# Patient Record
Sex: MALE | Race: WHITE | NOT HISPANIC OR LATINO | URBAN - METROPOLITAN AREA
[De-identification: names, ages, dates, MRNs, and addresses within clinical notes are randomized per-mention and may not be internally consistent; named-entity substitution may affect disease eponyms.]

---

## 2017-05-17 ENCOUNTER — EMERGENCY (EMERGENCY)
Facility: HOSPITAL | Age: 27
LOS: 1 days | Discharge: HOME | End: 2017-05-17
Attending: INTERNAL MEDICINE
Payer: COMMERCIAL

## 2017-05-17 VITALS
SYSTOLIC BLOOD PRESSURE: 145 MMHG | TEMPERATURE: 99 F | HEIGHT: 73 IN | RESPIRATION RATE: 20 BRPM | HEART RATE: 115 BPM | OXYGEN SATURATION: 98 % | WEIGHT: 259.93 LBS | DIASTOLIC BLOOD PRESSURE: 91 MMHG

## 2017-05-17 PROCEDURE — 99283 EMERGENCY DEPT VISIT LOW MDM: CPT

## 2017-05-17 RX ORDER — LACOSAMIDE 50 MG/1
200 TABLET ORAL ONCE
Qty: 0 | Refills: 0 | Status: DISCONTINUED | OUTPATIENT
Start: 2017-05-17 | End: 2017-05-17

## 2017-05-17 RX ORDER — DIVALPROEX SODIUM 500 MG/1
250 TABLET, DELAYED RELEASE ORAL ONCE
Qty: 0 | Refills: 0 | Status: COMPLETED | OUTPATIENT
Start: 2017-05-17 | End: 2017-05-17

## 2017-05-17 RX ORDER — DIVALPROEX SODIUM 500 MG/1
500 TABLET, DELAYED RELEASE ORAL ONCE
Qty: 0 | Refills: 0 | Status: COMPLETED | OUTPATIENT
Start: 2017-05-17 | End: 2017-05-17

## 2017-05-17 RX ADMIN — LACOSAMIDE 200 MILLIGRAM(S): 50 TABLET ORAL at 23:08

## 2017-05-17 RX ADMIN — DIVALPROEX SODIUM 250 MILLIGRAM(S): 500 TABLET, DELAYED RELEASE ORAL at 23:08

## 2017-05-17 RX ADMIN — DIVALPROEX SODIUM 500 MILLIGRAM(S): 500 TABLET, DELAYED RELEASE ORAL at 23:08

## 2017-05-17 NOTE — ED PROVIDER NOTE - OBJECTIVE STATEMENT
27 y/o M with PMHx of epilepsy BIB EMS presents to ED c/o seizure today. Pt states that seizures have been progressively more frequent for the past 6-9 months. He has been seen by his neurologist with multiple episodes of follow up and extensive work up but has had no medication changes. This afternoon, pt had a seizure witnessed by girlfriend. Pt described to turn blue, so pt's girlfriend called EMS. Seizure with spontaneous resolution lasting < 2 min total, which is normal for him. Generalized tonic clonic seizure, which is also normal for pt. Pt denies any recent illness, fever, headache, visual changes, AMS, chest pain. cough, nausea, vomiting, abd pain, urinary symptoms or any other complaints. NKDA. Pt states that he has been missing one medication for several days because he did not get the rx mailed to him in time. Pt also notes that he has had abnormal sleeping patterns - he went to bed after 4am last night, likely trigger for his seizure.

## 2017-05-17 NOTE — ED PROVIDER NOTE - MEDICAL DECISION MAKING DETAILS
Pt presenting with typical seizure, he is off one of his prescribed medication. Will provide doses of meds here, pt confirms having received rx for medication this afternoon and will fill prescription and resume regular meds first thing in morning. Attempted to discuss with neurologist, but neurologist is unavailable at this hour. Pt is stable for discharge home with neurology follow up this week.

## 2017-05-17 NOTE — ED PROVIDER NOTE - NS ED MD SCRIBE ATTENDING SCRIBE SECTIONS
HISTORY OF PRESENT ILLNESS/REVIEW OF SYSTEMS/VITAL SIGNS( Pullset)/HIV/PHYSICAL EXAM/PAST MEDICAL/SURGICAL/SOCIAL HISTORY/DISPOSITION

## 2017-05-17 NOTE — ED ADULT NURSE NOTE - OBJECTIVE STATEMENT
[facility] s/p seizure witnessed by family member that lasted over a minute with no trauma to the head . Patient stated has skipped a few doses and did not get to eat anything earlier today. Denies any other medical complaints.

## 2017-05-17 NOTE — ED ADULT TRIAGE NOTE - CHIEF COMPLAINT QUOTE
BIBA for witnessed gen tonic clonic seizure lasting 1 minute, h/o of seizure, pt has missed some meds the past few days and was also told his ammonia levels were high by his pmd

## 2017-05-17 NOTE — ED PROVIDER NOTE - NEUROLOGICAL, MLM
Alert and oriented, no focal deficits, no motor or sensory deficits. Alert and oriented, no focal deficits, no motor or sensory deficits. Coordination/gait normal.

## 2017-05-21 DIAGNOSIS — G40.909 EPILEPSY, UNSPECIFIED, NOT INTRACTABLE, WITHOUT STATUS EPILEPTICUS: ICD-10-CM

## 2018-04-21 ENCOUNTER — EMERGENCY (EMERGENCY)
Facility: HOSPITAL | Age: 28
LOS: 1 days | Discharge: HOME | End: 2018-04-21
Attending: EMERGENCY MEDICINE
Payer: COMMERCIAL

## 2018-04-21 VITALS
WEIGHT: 220.02 LBS | OXYGEN SATURATION: 98 % | TEMPERATURE: 97 F | HEART RATE: 124 BPM | RESPIRATION RATE: 17 BRPM | DIASTOLIC BLOOD PRESSURE: 81 MMHG | SYSTOLIC BLOOD PRESSURE: 116 MMHG

## 2018-04-21 LAB
ACETONE SERPL-MCNC: ABNORMAL
ALBUMIN SERPL ELPH-MCNC: 4.3 G/DL — SIGNIFICANT CHANGE UP (ref 3.5–5)
ALP SERPL-CCNC: 78 U/L — SIGNIFICANT CHANGE UP (ref 40–120)
ALT FLD-CCNC: 27 U/L DA — SIGNIFICANT CHANGE UP (ref 10–60)
AMPHET UR-MCNC: NEGATIVE — SIGNIFICANT CHANGE UP
ANION GAP SERPL CALC-SCNC: 9 MMOL/L — SIGNIFICANT CHANGE UP (ref 5–17)
APPEARANCE UR: CLEAR — SIGNIFICANT CHANGE UP
AST SERPL-CCNC: 20 U/L — SIGNIFICANT CHANGE UP (ref 10–40)
BACTERIA # UR AUTO: ABNORMAL /HPF
BARBITURATES UR SCN-MCNC: NEGATIVE — SIGNIFICANT CHANGE UP
BASOPHILS # BLD AUTO: 0.1 K/UL — SIGNIFICANT CHANGE UP (ref 0–0.2)
BASOPHILS NFR BLD AUTO: 1 % — SIGNIFICANT CHANGE UP (ref 0–2)
BENZODIAZ UR-MCNC: NEGATIVE — SIGNIFICANT CHANGE UP
BILIRUB SERPL-MCNC: 1 MG/DL — SIGNIFICANT CHANGE UP (ref 0.2–1.2)
BILIRUB UR-MCNC: NEGATIVE — SIGNIFICANT CHANGE UP
BUN SERPL-MCNC: 17 MG/DL — SIGNIFICANT CHANGE UP (ref 7–18)
CALCIUM SERPL-MCNC: 9.3 MG/DL — SIGNIFICANT CHANGE UP (ref 8.4–10.5)
CHLORIDE SERPL-SCNC: 106 MMOL/L — SIGNIFICANT CHANGE UP (ref 96–108)
CK SERPL-CCNC: 124 U/L — SIGNIFICANT CHANGE UP (ref 35–232)
CO2 SERPL-SCNC: 25 MMOL/L — SIGNIFICANT CHANGE UP (ref 22–31)
COCAINE METAB.OTHER UR-MCNC: NEGATIVE — SIGNIFICANT CHANGE UP
COLOR SPEC: YELLOW — SIGNIFICANT CHANGE UP
CREAT SERPL-MCNC: 1.02 MG/DL — SIGNIFICANT CHANGE UP (ref 0.5–1.3)
DIFF PNL FLD: ABNORMAL
EOSINOPHIL # BLD AUTO: 0 K/UL — SIGNIFICANT CHANGE UP (ref 0–0.5)
EOSINOPHIL NFR BLD AUTO: 0.5 % — SIGNIFICANT CHANGE UP (ref 0–6)
EPI CELLS # UR: SIGNIFICANT CHANGE UP /HPF
GLUCOSE SERPL-MCNC: 84 MG/DL — SIGNIFICANT CHANGE UP (ref 70–99)
GLUCOSE UR QL: NEGATIVE — SIGNIFICANT CHANGE UP
HCT VFR BLD CALC: 45.2 % — SIGNIFICANT CHANGE UP (ref 39–50)
HGB BLD-MCNC: 15.2 G/DL — SIGNIFICANT CHANGE UP (ref 13–17)
HYALINE CASTS # UR AUTO: ABNORMAL /LPF
KETONES UR-MCNC: ABNORMAL
LACTATE SERPL-SCNC: 2.8 MMOL/L — HIGH (ref 0.7–2)
LEUKOCYTE ESTERASE UR-ACNC: ABNORMAL
LYMPHOCYTES # BLD AUTO: 1 K/UL — SIGNIFICANT CHANGE UP (ref 1–3.3)
LYMPHOCYTES # BLD AUTO: 17.2 % — SIGNIFICANT CHANGE UP (ref 13–44)
MAGNESIUM SERPL-MCNC: 1.8 MG/DL — SIGNIFICANT CHANGE UP (ref 1.6–2.6)
MCHC RBC-ENTMCNC: 30.8 PG — SIGNIFICANT CHANGE UP (ref 27–34)
MCHC RBC-ENTMCNC: 33.7 GM/DL — SIGNIFICANT CHANGE UP (ref 32–36)
MCV RBC AUTO: 91.1 FL — SIGNIFICANT CHANGE UP (ref 80–100)
METHADONE UR-MCNC: NEGATIVE — SIGNIFICANT CHANGE UP
MONOCYTES # BLD AUTO: 0.3 K/UL — SIGNIFICANT CHANGE UP (ref 0–0.9)
MONOCYTES NFR BLD AUTO: 5.9 % — SIGNIFICANT CHANGE UP (ref 2–14)
NEUTROPHILS # BLD AUTO: 4.2 K/UL — SIGNIFICANT CHANGE UP (ref 1.8–7.4)
NEUTROPHILS NFR BLD AUTO: 75.4 % — SIGNIFICANT CHANGE UP (ref 43–77)
NITRITE UR-MCNC: NEGATIVE — SIGNIFICANT CHANGE UP
OPIATES UR-MCNC: NEGATIVE — SIGNIFICANT CHANGE UP
PCP SPEC-MCNC: SIGNIFICANT CHANGE UP
PCP UR-MCNC: NEGATIVE — SIGNIFICANT CHANGE UP
PH UR: 5 — SIGNIFICANT CHANGE UP (ref 5–8)
PLATELET # BLD AUTO: 138 K/UL — LOW (ref 150–400)
POTASSIUM SERPL-MCNC: 3.8 MMOL/L — SIGNIFICANT CHANGE UP (ref 3.5–5.3)
POTASSIUM SERPL-SCNC: 3.8 MMOL/L — SIGNIFICANT CHANGE UP (ref 3.5–5.3)
PROT SERPL-MCNC: 7.5 G/DL — SIGNIFICANT CHANGE UP (ref 6–8.3)
PROT UR-MCNC: 30 MG/DL
RBC # BLD: 4.96 M/UL — SIGNIFICANT CHANGE UP (ref 4.2–5.8)
RBC # FLD: 12.7 % — SIGNIFICANT CHANGE UP (ref 10.3–14.5)
RBC CASTS # UR COMP ASSIST: ABNORMAL /HPF (ref 0–2)
SODIUM SERPL-SCNC: 140 MMOL/L — SIGNIFICANT CHANGE UP (ref 135–145)
SP GR SPEC: 1.02 — SIGNIFICANT CHANGE UP (ref 1.01–1.02)
THC UR QL: NEGATIVE — SIGNIFICANT CHANGE UP
UROBILINOGEN FLD QL: NEGATIVE — SIGNIFICANT CHANGE UP
VALPROATE SERPL-MCNC: <3 UG/ML — LOW (ref 50–100)
WBC # BLD: 5.6 K/UL — SIGNIFICANT CHANGE UP (ref 3.8–10.5)
WBC # FLD AUTO: 5.6 K/UL — SIGNIFICANT CHANGE UP (ref 3.8–10.5)
WBC UR QL: ABNORMAL /HPF (ref 0–5)

## 2018-04-21 PROCEDURE — 80164 ASSAY DIPROPYLACETIC ACD TOT: CPT

## 2018-04-21 PROCEDURE — 81001 URINALYSIS AUTO W/SCOPE: CPT

## 2018-04-21 PROCEDURE — 80053 COMPREHEN METABOLIC PANEL: CPT

## 2018-04-21 PROCEDURE — 83735 ASSAY OF MAGNESIUM: CPT

## 2018-04-21 PROCEDURE — 82550 ASSAY OF CK (CPK): CPT

## 2018-04-21 PROCEDURE — 83605 ASSAY OF LACTIC ACID: CPT

## 2018-04-21 PROCEDURE — 82009 KETONE BODYS QUAL: CPT

## 2018-04-21 PROCEDURE — 80307 DRUG TEST PRSMV CHEM ANLYZR: CPT

## 2018-04-21 PROCEDURE — 99283 EMERGENCY DEPT VISIT LOW MDM: CPT

## 2018-04-21 PROCEDURE — 99285 EMERGENCY DEPT VISIT HI MDM: CPT

## 2018-04-21 PROCEDURE — 85027 COMPLETE CBC AUTOMATED: CPT

## 2018-04-21 PROCEDURE — 82962 GLUCOSE BLOOD TEST: CPT

## 2018-04-21 RX ORDER — PERAMPANEL 2 MG/1
1 TABLET ORAL
Qty: 14 | Refills: 0 | OUTPATIENT
Start: 2018-04-21 | End: 2018-05-04

## 2018-04-21 RX ORDER — SODIUM CHLORIDE 9 MG/ML
1000 INJECTION INTRAMUSCULAR; INTRAVENOUS; SUBCUTANEOUS
Qty: 0 | Refills: 0 | Status: DISCONTINUED | OUTPATIENT
Start: 2018-04-21 | End: 2018-04-25

## 2018-04-21 RX ORDER — LACOSAMIDE 50 MG/1
1 TABLET ORAL
Qty: 28 | Refills: 0 | OUTPATIENT
Start: 2018-04-21 | End: 2018-05-04

## 2018-04-21 NOTE — ED ADULT NURSE NOTE - OBJECTIVE STATEMENT
Patient brought in for witnessed seizure while out in public. Patient is now A&Ox3, speaking clearly, in no acute distress. Patient said he had a seizure while jogging outside, can't recall how long it lasted. Patient said that he didn't take his seizure meds today.

## 2018-04-21 NOTE — ED ADULT NURSE NOTE - CHPI ED SYMPTOMS NEG
no change in level of consciousness/no confusion/no fever/no blurred vision/no vomiting/no nausea/no numbness/no loss of consciousness

## 2018-04-21 NOTE — ED PROVIDER NOTE - PROGRESS NOTE DETAILS
Pt refuses IV anti-seizure medication, claims he's on Vimpat and Fycompa. Pt refuses IV fluids in ED as well.  Mother claims they will go to the pharmacy and get the medication ASAP. Pt w/ no further sz activity in ED. Will discharge home and advise pt to take his meds. Will refer to PMD and neurologist as out pt. Pt refuses IV anti-seizure medication, claims he's on Vimpat and Fycompa. Pt refuses IV fluids in ED as well.  Mother claims they will go to the pharmacy and get the medication ASAP. Pt w/ no further sz activity in ED. Will discharge home and advise pt to take his meds & to drink plenty of fluids.   Will refer to PMD and neurologist as out pt.

## 2018-04-21 NOTE — ED PROVIDER NOTE - OBJECTIVE STATEMENT
26 y/o M w/ PMHx of seizures BIB EMS to ED. Pt states he was working out running on street and he woke up and found himself in ED. Pt has no recollection of what happened. Pt sustained abrasion to L knee. Notes that he has not had his usual anti seizure medication for 1.5 weeks. Per EMS, pt had a seizure episode. Denies any nausea, vomiting or any other complaints currently.

## 2018-04-22 RX ORDER — LACOSAMIDE 50 MG/1
1 TABLET ORAL
Qty: 28 | Refills: 0 | OUTPATIENT
Start: 2018-04-22 | End: 2018-05-05

## 2018-04-22 RX ORDER — PERAMPANEL 2 MG/1
1 TABLET ORAL
Qty: 14 | Refills: 0 | OUTPATIENT
Start: 2018-04-22 | End: 2018-05-05

## 2018-04-24 NOTE — ED POST DISCHARGE NOTE - ADDITIONAL DOCUMENTATION
Pharmacist called - patient's new insurance does not cover Vimpat. Patient used to be on depakote 500mg ER BID. Refilled that for 14 days. Pharmacist will inform patient that he must f/u with his neurologist to get the Vimpat authorized.

## 2018-05-02 ENCOUNTER — EMERGENCY (EMERGENCY)
Facility: HOSPITAL | Age: 28
LOS: 1 days | Discharge: HOME | End: 2018-05-02
Attending: EMERGENCY MEDICINE
Payer: COMMERCIAL

## 2018-05-02 VITALS
TEMPERATURE: 98 F | HEART RATE: 83 BPM | SYSTOLIC BLOOD PRESSURE: 137 MMHG | OXYGEN SATURATION: 98 % | RESPIRATION RATE: 21 BRPM | DIASTOLIC BLOOD PRESSURE: 88 MMHG

## 2018-05-02 VITALS
SYSTOLIC BLOOD PRESSURE: 130 MMHG | DIASTOLIC BLOOD PRESSURE: 84 MMHG | OXYGEN SATURATION: 98 % | RESPIRATION RATE: 20 BRPM | HEART RATE: 115 BPM

## 2018-05-02 DIAGNOSIS — Z98.890 OTHER SPECIFIED POSTPROCEDURAL STATES: Chronic | ICD-10-CM

## 2018-05-02 LAB
ALBUMIN SERPL ELPH-MCNC: 4.4 G/DL — SIGNIFICANT CHANGE UP (ref 3.3–5)
ALP SERPL-CCNC: 70 U/L — SIGNIFICANT CHANGE UP (ref 40–120)
ALT FLD-CCNC: 17 U/L — SIGNIFICANT CHANGE UP (ref 10–45)
ANION GAP SERPL CALC-SCNC: 17 MMOL/L — SIGNIFICANT CHANGE UP (ref 5–17)
AST SERPL-CCNC: 20 U/L — SIGNIFICANT CHANGE UP (ref 10–40)
BASE EXCESS BLDV CALC-SCNC: 1.7 MMOL/L — SIGNIFICANT CHANGE UP (ref -2–2)
BASOPHILS # BLD AUTO: 0 K/UL — SIGNIFICANT CHANGE UP (ref 0–0.2)
BASOPHILS NFR BLD AUTO: 0.1 % — SIGNIFICANT CHANGE UP (ref 0–2)
BILIRUB SERPL-MCNC: 0.7 MG/DL — SIGNIFICANT CHANGE UP (ref 0.2–1.2)
BUN SERPL-MCNC: 10 MG/DL — SIGNIFICANT CHANGE UP (ref 7–23)
CA-I SERPL-SCNC: 1.19 MMOL/L — SIGNIFICANT CHANGE UP (ref 1.12–1.3)
CALCIUM SERPL-MCNC: 9.8 MG/DL — SIGNIFICANT CHANGE UP (ref 8.4–10.5)
CHLORIDE BLDV-SCNC: 104 MMOL/L — SIGNIFICANT CHANGE UP (ref 96–108)
CHLORIDE SERPL-SCNC: 100 MMOL/L — SIGNIFICANT CHANGE UP (ref 96–108)
CO2 BLDV-SCNC: 26 MMOL/L — SIGNIFICANT CHANGE UP (ref 22–30)
CO2 SERPL-SCNC: 24 MMOL/L — SIGNIFICANT CHANGE UP (ref 22–31)
CREAT SERPL-MCNC: 0.87 MG/DL — SIGNIFICANT CHANGE UP (ref 0.5–1.3)
EOSINOPHIL # BLD AUTO: 0 K/UL — SIGNIFICANT CHANGE UP (ref 0–0.5)
EOSINOPHIL NFR BLD AUTO: 0.5 % — SIGNIFICANT CHANGE UP (ref 0–6)
GAS PNL BLDV: 138 MMOL/L — SIGNIFICANT CHANGE UP (ref 136–145)
GAS PNL BLDV: SIGNIFICANT CHANGE UP
GLUCOSE BLDV-MCNC: 105 MG/DL — HIGH (ref 70–99)
GLUCOSE SERPL-MCNC: 109 MG/DL — HIGH (ref 70–99)
HCO3 BLDV-SCNC: 25 MMOL/L — SIGNIFICANT CHANGE UP (ref 21–29)
HCT VFR BLD CALC: 44.8 % — SIGNIFICANT CHANGE UP (ref 39–50)
HCT VFR BLDA CALC: 43 % — SIGNIFICANT CHANGE UP (ref 39–50)
HGB BLD CALC-MCNC: 14 G/DL — SIGNIFICANT CHANGE UP (ref 13–17)
HGB BLD-MCNC: 15.4 G/DL — SIGNIFICANT CHANGE UP (ref 13–17)
LACTATE BLDV-MCNC: 3.8 MMOL/L — HIGH (ref 0.7–2)
LYMPHOCYTES # BLD AUTO: 1.3 K/UL — SIGNIFICANT CHANGE UP (ref 1–3.3)
LYMPHOCYTES # BLD AUTO: 21 % — SIGNIFICANT CHANGE UP (ref 13–44)
MAGNESIUM SERPL-MCNC: 1.6 MG/DL — SIGNIFICANT CHANGE UP (ref 1.6–2.6)
MCHC RBC-ENTMCNC: 31.7 PG — SIGNIFICANT CHANGE UP (ref 27–34)
MCHC RBC-ENTMCNC: 34.4 GM/DL — SIGNIFICANT CHANGE UP (ref 32–36)
MCV RBC AUTO: 92.1 FL — SIGNIFICANT CHANGE UP (ref 80–100)
MONOCYTES # BLD AUTO: 0.6 K/UL — SIGNIFICANT CHANGE UP (ref 0–0.9)
MONOCYTES NFR BLD AUTO: 9.6 % — SIGNIFICANT CHANGE UP (ref 2–14)
NEUTROPHILS # BLD AUTO: 4.4 K/UL — SIGNIFICANT CHANGE UP (ref 1.8–7.4)
NEUTROPHILS NFR BLD AUTO: 68.8 % — SIGNIFICANT CHANGE UP (ref 43–77)
PCO2 BLDV: 37 MMHG — SIGNIFICANT CHANGE UP (ref 35–50)
PH BLDV: 7.45 — SIGNIFICANT CHANGE UP (ref 7.35–7.45)
PLATELET # BLD AUTO: 152 K/UL — SIGNIFICANT CHANGE UP (ref 150–400)
PO2 BLDV: 62 MMHG — HIGH (ref 25–45)
POTASSIUM BLDV-SCNC: 3.8 MMOL/L — SIGNIFICANT CHANGE UP (ref 3.5–5)
POTASSIUM SERPL-MCNC: 3.9 MMOL/L — SIGNIFICANT CHANGE UP (ref 3.5–5.3)
POTASSIUM SERPL-SCNC: 3.9 MMOL/L — SIGNIFICANT CHANGE UP (ref 3.5–5.3)
PROT SERPL-MCNC: 7.2 G/DL — SIGNIFICANT CHANGE UP (ref 6–8.3)
RBC # BLD: 4.87 M/UL — SIGNIFICANT CHANGE UP (ref 4.2–5.8)
RBC # FLD: 12.3 % — SIGNIFICANT CHANGE UP (ref 10.3–14.5)
SAO2 % BLDV: 92 % — HIGH (ref 67–88)
SODIUM SERPL-SCNC: 141 MMOL/L — SIGNIFICANT CHANGE UP (ref 135–145)
WBC # BLD: 6.4 K/UL — SIGNIFICANT CHANGE UP (ref 3.8–10.5)
WBC # FLD AUTO: 6.4 K/UL — SIGNIFICANT CHANGE UP (ref 3.8–10.5)

## 2018-05-02 PROCEDURE — 82947 ASSAY GLUCOSE BLOOD QUANT: CPT

## 2018-05-02 PROCEDURE — 83735 ASSAY OF MAGNESIUM: CPT

## 2018-05-02 PROCEDURE — 84295 ASSAY OF SERUM SODIUM: CPT

## 2018-05-02 PROCEDURE — 83605 ASSAY OF LACTIC ACID: CPT

## 2018-05-02 PROCEDURE — 99284 EMERGENCY DEPT VISIT MOD MDM: CPT | Mod: 25

## 2018-05-02 PROCEDURE — 93005 ELECTROCARDIOGRAM TRACING: CPT

## 2018-05-02 PROCEDURE — 84132 ASSAY OF SERUM POTASSIUM: CPT

## 2018-05-02 PROCEDURE — 82803 BLOOD GASES ANY COMBINATION: CPT

## 2018-05-02 PROCEDURE — 80053 COMPREHEN METABOLIC PANEL: CPT

## 2018-05-02 PROCEDURE — 85027 COMPLETE CBC AUTOMATED: CPT

## 2018-05-02 PROCEDURE — 82330 ASSAY OF CALCIUM: CPT

## 2018-05-02 PROCEDURE — 93010 ELECTROCARDIOGRAM REPORT: CPT | Mod: 59

## 2018-05-02 PROCEDURE — 85014 HEMATOCRIT: CPT

## 2018-05-02 PROCEDURE — 82435 ASSAY OF BLOOD CHLORIDE: CPT

## 2018-05-02 RX ORDER — SODIUM CHLORIDE 9 MG/ML
1000 INJECTION, SOLUTION INTRAVENOUS ONCE
Qty: 0 | Refills: 0 | Status: COMPLETED | OUTPATIENT
Start: 2018-05-02 | End: 2018-05-02

## 2018-05-02 RX ORDER — LACOSAMIDE 50 MG/1
1 TABLET ORAL
Qty: 60 | Refills: 0 | OUTPATIENT
Start: 2018-05-02 | End: 2018-05-31

## 2018-05-02 RX ADMIN — SODIUM CHLORIDE 1000 MILLILITER(S): 9 INJECTION, SOLUTION INTRAVENOUS at 14:49

## 2018-05-02 NOTE — ED PROVIDER NOTE - ATTENDING CONTRIBUTION TO CARE
Isabel Lange MD - Attending Physician: I have personally seen and examined this patient with the resident/fellow.  I have fully participated in the care of this patient. I have reviewed all pertinent clinical information, including history, physical exam, plan and the Resident/Fellow’s note and agree except as noted. See MDM

## 2018-05-02 NOTE — ED PROVIDER NOTE - PLAN OF CARE
Restart depakote twice daily home dose as prescribed previously   Start vimpat twice daily as prescribed  Make appointment with neurologist as soon as possible  Neuro clinic: [phone], may also call Dr. Nissenbaum or your usual neurologist  See your primary doctor in 1-2 days  Return to emergency department for severe headache, nausea/vomiting, confusion, vision changes, loss of consciousness, chest pain, shortness of breath, seizure, or if you have any new or changing symptoms or concerns

## 2018-05-02 NOTE — ED PROVIDER NOTE - NEUROLOGICAL, MLM
Alert and oriented, speech normal, CN II-XII intact, no focal deficits, no motor or sensory deficits, no pronator drift, finger to nose testing normal, gait normal.

## 2018-05-02 NOTE — ED PROVIDER NOTE - PROGRESS NOTE DETAILS
Patient well appearing and in NAD, HR 80. Discussed f/u with Dr. Nissenbaum or in neuro clinic, or with primary neurologist, whichever he is able to do most quickly.  Discussed restarting medications, smoking cessation, return instructions, wash abrasion with soap and water.  Provided with copy of results.  Expresses understanding.   Linda Escobar, PGY3

## 2018-05-02 NOTE — ED PROVIDER NOTE - CARE PLAN
Assessment and plan of treatment:	Restart depakote twice daily home dose as prescribed previously   Start vimpat twice daily as prescribed  Make appointment with neurologist as soon as possible  Neuro clinic: [phone], may also call Dr. Nissenbaum or your usual neurologist  See your primary doctor in 1-2 days  Return to emergency department for severe headache, nausea/vomiting, confusion, vision changes, loss of consciousness, chest pain, shortness of breath, seizure, or if you have any new or changing symptoms or concerns Principal Discharge DX:	Seizure disorder  Assessment and plan of treatment:	Restart depakote twice daily home dose as prescribed previously   Start vimpat twice daily as prescribed  Make appointment with neurologist as soon as possible  Neuro clinic: [phone], may also call Dr. Nissenbaum or your usual neurologist  See your primary doctor in 1-2 days  Return to emergency department for severe headache, nausea/vomiting, confusion, vision changes, loss of consciousness, chest pain, shortness of breath, seizure, or if you have any new or changing symptoms or concerns

## 2018-05-02 NOTE — ED PROVIDER NOTE - MEDICAL DECISION MAKING DETAILS
Patient with breakthrough seizures 2/2 inability to obtain medications, this is third since discontinuing.  Slightly atypical for his usual seizures, will obtain bloodwork and EKG, ddx includes syncope. Likely resume his depakote and prescribe vimpat while awaiting neuro f/u. Patient with breakthrough seizures 2/2 inability to obtain medications, this is third since discontinuing.  Slightly atypical for his usual seizures, will obtain bloodwork and EKG, ddx includes syncope. Likely resume his depakote and prescribe vimpat while awaiting neuro f/u.    Isabel Lange MD - Attending Physician: Pt with breakthrough seizures, off his meds. Well appearing, at his baseline now, neuro intact. Pt needs to restart his meds

## 2018-05-02 NOTE — ED ADULT NURSE NOTE - OBJECTIVE STATEMENT
27y male brought in by EMS s/p seizure. A&Ox3 and tachycardic. Hx of epilepsy. Patient switched to new insurance which does not cover his anti-seizure medications so he has been non-compliant with medication regimen. Patient has not been taking any medications for 3 weeks. Patient presents to ED s/p seizure at work today at 1330. Patient denies aura (usually accompany his seizures), tongue biting, and urinating on self. Patient states he had another seizure on Saturday as well. EMS fingerstick 113. EMS place 16g IV in right forearm. Denies chest pain, sob, fever/chills, n/v/d. PERRLA 5mm, sensation intact, face symmetrical, strength in all extremities.

## 2018-05-02 NOTE — ED PROVIDER NOTE - OBJECTIVE STATEMENT
Patient has history of seizure disorder.  Was previously on depakote 500mg BID +vimpat 200mg BID, recently switched to vimpat and perampanel.  Insurance does not cover his new medications so he has not taken them for 3 weeks. Was scheduled to have PMD appt for referral to neurologist today.  Has had 3 seizures since the medication was stopped. One was 1.5 weeks ago, one was 4 days ago, and last one was today.  Today had brief tonic clonic episode witnessed by coworkers with no tongue biting or incontinence, denies confusion afterward, says he remembers getting into the ambulance. All of the seizures have been like this, though his usual seizures have an aura and are followed by confusion and headache.  FSG by , tachycardic but improving from 150 to 100.  Anxious.

## 2018-05-03 NOTE — ED POST DISCHARGE NOTE - REASON FOR FOLLOW-UP
Other Received a call from patient's pharmacy stating Vimpat requires a prior authorization.  I obtained prior auth ([id number]).  Prior authorization #: [id number].  Contacted [facility] and they report prescription has successfully gone through.  -Jian Lu PA-C

## 2018-06-01 ENCOUNTER — EMERGENCY (EMERGENCY)
Facility: HOSPITAL | Age: 28
LOS: 1 days | Discharge: HOME | End: 2018-06-01
Attending: EMERGENCY MEDICINE
Payer: COMMERCIAL

## 2018-06-01 VITALS
RESPIRATION RATE: 18 BRPM | TEMPERATURE: 98 F | HEART RATE: 117 BPM | OXYGEN SATURATION: 100 % | SYSTOLIC BLOOD PRESSURE: 147 MMHG | DIASTOLIC BLOOD PRESSURE: 85 MMHG

## 2018-06-01 VITALS — HEART RATE: 89 BPM | RESPIRATION RATE: 12 BRPM

## 2018-06-01 PROBLEM — Z00.00 ENCOUNTER FOR PREVENTIVE HEALTH EXAMINATION: Status: ACTIVE | Noted: 2018-06-01

## 2018-06-01 PROCEDURE — 93005 ELECTROCARDIOGRAM TRACING: CPT

## 2018-06-01 PROCEDURE — 99283 EMERGENCY DEPT VISIT LOW MDM: CPT

## 2018-06-01 PROCEDURE — 82962 GLUCOSE BLOOD TEST: CPT

## 2018-06-01 PROCEDURE — 93010 ELECTROCARDIOGRAM REPORT: CPT | Mod: 59

## 2018-06-01 PROCEDURE — 99284 EMERGENCY DEPT VISIT MOD MDM: CPT | Mod: 25

## 2018-06-01 NOTE — ED PROVIDER NOTE - PROGRESS NOTE DETAILS
Patient feeling better. Patient ambulatory with study gait and tolerating PO. Will follow up out patient with his neurologist.

## 2018-06-01 NOTE — ED PROVIDER NOTE - OBJECTIVE STATEMENT
26 yo M with pmhx seizures presenting sp seizure. Patient BIBA after a witnessed seizure on the bus going home from work. As per EMS, patient didn't fall and seizure lasted about 3-5 mins. Patient denies trauma or biting his tongue. Patient states he is taking his depakote and lacosamide as directed. Patient is supposed to go for blood work tomorrow and MRI and EEG on June 14th. Patient feels better and is currently asymptomatic. Patient denies cp, sob, tingling, numbness, weakness, blurred vision, ha, abd pain, nvd, dysuria, and hematuria

## 2018-06-01 NOTE — ED PROVIDER NOTE - PLAN OF CARE
rest and hydration. continue medication as prescribed. Follow up with your neurologist in 1-2 days. Return to the ER immediately for worsening symptoms(weakness, fever, pain, vomiting,  and blurred vision)

## 2018-06-01 NOTE — ED PROVIDER NOTE - ATTENDING CONTRIBUTION TO CARE
------------ATTENDING NOTE------------ ------------ATTENDING NOTE------------  pt brought to ED after several minute witnessed generalized seizure on public bus tonight, no trauma, has been complaint w/ AED, describes his typical aura prior to seizure, on ED arrival w/ nml mental status, nml neuro exam (AOX3, nml speech, CN grossly intact, nml strength/sensation, nml gait, no ataxia), HD stable w/ nml VS, tolerating PO, no recent fevers/illness, pt understands situation and does not want any additional testing, has his Neuro appt tomorrow AM, in depth dw pt about ddx, tx, jett, close outpt fu, seizure precautions.  - Melo Hough MD   -------------------------------------------------

## 2018-06-01 NOTE — ED ADULT NURSE NOTE - OBJECTIVE STATEMENT
Patient presents to Ed s/p seizure via amb. Patient with hx of Epilepsy reports he was sitting on a [employer] bus today  when he began seizing. Patient A&Ox3 denies head injury, reports he did not fall denies pain tongue intact. Patient  reports he is scheduled for an Mri/ Mra on June 16th. Per Ems patients glucose level was 106, patient arrived with  Lt hand 18g iv lock placed by Ems. Patient denies chest pain, no sob nausea vomiting headache or dizziness.

## 2018-06-13 ENCOUNTER — APPOINTMENT (OUTPATIENT)
Dept: MRI IMAGING | Facility: CLINIC | Age: 28
End: 2018-06-13

## 2018-07-05 ENCOUNTER — EMERGENCY (EMERGENCY)
Facility: HOSPITAL | Age: 28
LOS: 1 days | Discharge: HOME | End: 2018-07-05
Attending: EMERGENCY MEDICINE
Payer: COMMERCIAL

## 2018-07-05 VITALS
TEMPERATURE: 99 F | SYSTOLIC BLOOD PRESSURE: 110 MMHG | DIASTOLIC BLOOD PRESSURE: 70 MMHG | HEART RATE: 101 BPM | OXYGEN SATURATION: 95 % | HEIGHT: 72 IN | RESPIRATION RATE: 16 BRPM | WEIGHT: 205.03 LBS

## 2018-07-05 LAB
ALBUMIN SERPL ELPH-MCNC: 4.5 G/DL — SIGNIFICANT CHANGE UP (ref 3.3–5)
ALP SERPL-CCNC: 52 U/L — SIGNIFICANT CHANGE UP (ref 40–120)
ALT FLD-CCNC: 12 U/L — SIGNIFICANT CHANGE UP (ref 10–45)
ANION GAP SERPL CALC-SCNC: 18 MMOL/L — HIGH (ref 5–17)
AST SERPL-CCNC: 18 U/L — SIGNIFICANT CHANGE UP (ref 10–40)
BASOPHILS # BLD AUTO: 0 K/UL — SIGNIFICANT CHANGE UP (ref 0–0.2)
BASOPHILS NFR BLD AUTO: 0.3 % — SIGNIFICANT CHANGE UP (ref 0–2)
BILIRUB SERPL-MCNC: 0.6 MG/DL — SIGNIFICANT CHANGE UP (ref 0.2–1.2)
BUN SERPL-MCNC: 16 MG/DL — SIGNIFICANT CHANGE UP (ref 7–23)
CALCIUM SERPL-MCNC: 9.6 MG/DL — SIGNIFICANT CHANGE UP (ref 8.4–10.5)
CHLORIDE SERPL-SCNC: 98 MMOL/L — SIGNIFICANT CHANGE UP (ref 96–108)
CO2 SERPL-SCNC: 24 MMOL/L — SIGNIFICANT CHANGE UP (ref 22–31)
CREAT SERPL-MCNC: 1.04 MG/DL — SIGNIFICANT CHANGE UP (ref 0.5–1.3)
EOSINOPHIL # BLD AUTO: 0 K/UL — SIGNIFICANT CHANGE UP (ref 0–0.5)
EOSINOPHIL NFR BLD AUTO: 0.7 % — SIGNIFICANT CHANGE UP (ref 0–6)
GLUCOSE SERPL-MCNC: 83 MG/DL — SIGNIFICANT CHANGE UP (ref 70–99)
HCT VFR BLD CALC: 43.2 % — SIGNIFICANT CHANGE UP (ref 39–50)
HGB BLD-MCNC: 15 G/DL — SIGNIFICANT CHANGE UP (ref 13–17)
LYMPHOCYTES # BLD AUTO: 1.6 K/UL — SIGNIFICANT CHANGE UP (ref 1–3.3)
LYMPHOCYTES # BLD AUTO: 21.3 % — SIGNIFICANT CHANGE UP (ref 13–44)
MAGNESIUM SERPL-MCNC: 1.8 MG/DL — SIGNIFICANT CHANGE UP (ref 1.6–2.6)
MCHC RBC-ENTMCNC: 32.3 PG — SIGNIFICANT CHANGE UP (ref 27–34)
MCHC RBC-ENTMCNC: 34.6 GM/DL — SIGNIFICANT CHANGE UP (ref 32–36)
MCV RBC AUTO: 93.4 FL — SIGNIFICANT CHANGE UP (ref 80–100)
MONOCYTES # BLD AUTO: 0.7 K/UL — SIGNIFICANT CHANGE UP (ref 0–0.9)
MONOCYTES NFR BLD AUTO: 9.7 % — SIGNIFICANT CHANGE UP (ref 2–14)
NEUTROPHILS # BLD AUTO: 5.1 K/UL — SIGNIFICANT CHANGE UP (ref 1.8–7.4)
NEUTROPHILS NFR BLD AUTO: 68 % — SIGNIFICANT CHANGE UP (ref 43–77)
PHOSPHATE SERPL-MCNC: 2.5 MG/DL — SIGNIFICANT CHANGE UP (ref 2.5–4.5)
PLATELET # BLD AUTO: 133 K/UL — LOW (ref 150–400)
POTASSIUM SERPL-MCNC: 4.1 MMOL/L — SIGNIFICANT CHANGE UP (ref 3.5–5.3)
POTASSIUM SERPL-SCNC: 4.1 MMOL/L — SIGNIFICANT CHANGE UP (ref 3.5–5.3)
PROT SERPL-MCNC: 7.2 G/DL — SIGNIFICANT CHANGE UP (ref 6–8.3)
RBC # BLD: 4.63 M/UL — SIGNIFICANT CHANGE UP (ref 4.2–5.8)
RBC # FLD: 11.9 % — SIGNIFICANT CHANGE UP (ref 10.3–14.5)
SODIUM SERPL-SCNC: 140 MMOL/L — SIGNIFICANT CHANGE UP (ref 135–145)
VALPROATE SERPL-MCNC: 81 UG/ML — SIGNIFICANT CHANGE UP (ref 50–100)
WBC # BLD: 7.5 K/UL — SIGNIFICANT CHANGE UP (ref 3.8–10.5)
WBC # FLD AUTO: 7.5 K/UL — SIGNIFICANT CHANGE UP (ref 3.8–10.5)

## 2018-07-05 PROCEDURE — 83735 ASSAY OF MAGNESIUM: CPT

## 2018-07-05 PROCEDURE — 99284 EMERGENCY DEPT VISIT MOD MDM: CPT

## 2018-07-05 PROCEDURE — 36415 COLL VENOUS BLD VENIPUNCTURE: CPT

## 2018-07-05 PROCEDURE — 82962 GLUCOSE BLOOD TEST: CPT

## 2018-07-05 PROCEDURE — 84100 ASSAY OF PHOSPHORUS: CPT

## 2018-07-05 PROCEDURE — 80164 ASSAY DIPROPYLACETIC ACD TOT: CPT

## 2018-07-05 PROCEDURE — 99283 EMERGENCY DEPT VISIT LOW MDM: CPT

## 2018-07-05 PROCEDURE — 85027 COMPLETE CBC AUTOMATED: CPT

## 2018-07-05 PROCEDURE — 80053 COMPREHEN METABOLIC PANEL: CPT

## 2018-07-05 NOTE — ED PROVIDER NOTE - MEDICAL DECISION MAKING DETAILS
28y/o M with known seizure disorder, here s/p breakthrough seizure tonight due to medication non-compliance. Will check anti-convulsant levels, reassess.

## 2018-07-05 NOTE — ED PROVIDER NOTE - CRANIAL NERVE AND PUPILLARY EXAM
cranial nerves 2-12 intact/Pupils equal, round, and reactive to light at 6mm bilaterally. Normal finger-to-nose. Babinski reflex intact. Strength and sensation fully intact to all extremities./extra-ocular movements intact

## 2018-07-05 NOTE — ED PROVIDER NOTE - NS_ ATTENDINGSCRIBEDETAILS _ED_A_ED_FT
The scribe's documentation has been prepared under my direction and personally reviewed by me in its entirety. I confirm that the note above accurately reflects all work, treatment, procedures, and medical decision making performed by me. The scribe's documentation has been prepared under my direction and personally reviewed by me in its entirety. I confirm that the note above accurately reflects all work, treatment, procedures, and medical decision making performed by me  I have seen and evaluated this patient with the resident.   I agree with the findings  unless other wise stated.  I have made appropriate changes in documentations where needed, After my face to face bedside evaluation, I am further  notiny/o M with known seizure disorder, here s/p breakthrough seizure tonight due to medication non-compliance. Will check anti-convulsant levels, reassess.

## 2018-07-05 NOTE — ED PROVIDER NOTE - CARE PLAN
Principal Discharge DX:	Seizure  Assessment and plan of treatment:	Please follow up with your neurologist as soon as possible. If you need a neurologist, you can call the [facility] [place] at the following number: [phone]. You can also make an appointment at the following location.  [facility] [place]  Phone: [phone]  Address: [address] [place] [address]  Office Hours: Monday – Friday: 8am – 5pm   Please take all of your previously prescribed epilepsy medications as prescribed. Please return to the emergency department if you experience worsening symptoms, or if you develop headache, neck stiffness, fever/chills, changes in vision, chest pain, shortness of breath, difficulty breathing, palpitations, lightheadedness, weakness, dizziness, numbness, tingling, abdominal pain, nausea, vomiting, diarrhea, changes in your urine, blood in the urine, painful urination, syncope (passing out), or for any other concerns.

## 2018-07-05 NOTE — ED PROVIDER NOTE - ATTENDING CONTRIBUTION TO CARE
I have seen and evaluated this patient with the resident.   I agree with the findings  unless other wise stated.  I have made appropriate changes in documentations where needed, After my face to face bedside evaluation, I am further  notiny/o M with known seizure disorder, here s/p breakthrough seizure tonight due to medication non-compliance. Will check anti-convulsant levels, reassessed remained sz free levels checked will have outpatient f/u--jones

## 2018-07-05 NOTE — ED ADULT NURSE NOTE - OBJECTIVE STATEMENT
26 yo male complaining of seizure "I usually have seizures, I have been taking medication, all like I should and I usually do not come to the ED for seizure but today it happened in the bathroom and I hit my head, I just want to make sure I am not bleeding from inside". Pt states seizure happened at 1830, witnessed, for about 2 minutes. Pt sustained a laceration on left forehead and scrapes on left knee. Abrasion noted on left shoulder, no active bleeding at this moment. Pt alerted and oriented x3, no signs of acute distress noted at this moment. Pt states he does not need seizure precaution, however, RN and MD insisted, pt agreed on following protocol on seizure safety precautions. Family at the bedside, IV placed on right AC 20G, blood drawn, sent to lab. Will continue to monitor closely.

## 2018-07-05 NOTE — ED PROVIDER NOTE - OBJECTIVE STATEMENT
26y/o M with PMHx of seizure disorder (Depakote and Vimpat) presents to the ED s/p seizure at 6:30PM today. Pt was in his bathroom and impacted his forehead against the wall, now has an abrasion to his L forehead. Per girlfriend: he was post-ictal for ~10m. He did not take Vimpat yesterday due to a pharmacy error. Pt recently switched to a new Neurologist and does not remember his name. Pt recalls his last tetanus shot was 5-6y ago. Denies dizziness, numbness/weakness, abdominal pain, chest pain, SOB, any other complaints. NKDA.

## 2018-07-05 NOTE — ED PROVIDER NOTE - PLAN OF CARE
Please follow up with your neurologist as soon as possible. If you need a neurologist, you can call the [facility] [place] at the following number: [phone]. You can also make an appointment at the following location.  [facility] at [place]  Phone: [phone]  Address: [address] [place] [address]  Office Hours: Monday – Friday: 8am – 5pm   Please take all of your previously prescribed epilepsy medications as prescribed. Please return to the emergency department if you experience worsening symptoms, or if you develop headache, neck stiffness, fever/chills, changes in vision, chest pain, shortness of breath, difficulty breathing, palpitations, lightheadedness, weakness, dizziness, numbness, tingling, abdominal pain, nausea, vomiting, diarrhea, changes in your urine, blood in the urine, painful urination, syncope (passing out), or for any other concerns.

## 2018-10-05 ENCOUNTER — EMERGENCY (EMERGENCY)
Facility: HOSPITAL | Age: 28
LOS: 1 days | Discharge: HOME | End: 2018-10-05
Payer: COMMERCIAL

## 2018-10-05 VITALS
RESPIRATION RATE: 17 BRPM | HEART RATE: 111 BPM | DIASTOLIC BLOOD PRESSURE: 87 MMHG | OXYGEN SATURATION: 95 % | SYSTOLIC BLOOD PRESSURE: 137 MMHG | TEMPERATURE: 98 F

## 2018-10-05 VITALS
RESPIRATION RATE: 18 BRPM | SYSTOLIC BLOOD PRESSURE: 147 MMHG | OXYGEN SATURATION: 99 % | DIASTOLIC BLOOD PRESSURE: 89 MMHG | WEIGHT: 205.03 LBS | TEMPERATURE: 98 F | HEART RATE: 106 BPM

## 2018-10-05 PROBLEM — G40.909 EPILEPSY, UNSPECIFIED, NOT INTRACTABLE, WITHOUT STATUS EPILEPTICUS: Chronic | Status: ACTIVE | Noted: 2018-05-02

## 2018-10-05 PROCEDURE — 99283 EMERGENCY DEPT VISIT LOW MDM: CPT

## 2018-10-05 NOTE — ED PROVIDER NOTE - PROGRESS NOTE DETAILS
Pt feels well, declining blood work. Wants to go home. Discussed with ED MD who evaluated patient and agreeable with discharge. Stable for d/c. Has capacity to make own medical decisions. - RITESH BeeC

## 2018-10-05 NOTE — ED PROVIDER NOTE - CARE PLAN
Principal Discharge DX:	Seizure disorder  Assessment and plan of treatment:	1. Follow up with your PMD in 1-2 days.   2. Rest, stay hydrated. Continue taking all your previously prescribed medications including your seizure medications.   3. Follow up with your neurologist as previously scheduled or within the next 2-3 days. If you cannot get in to see your neurologist please follow up with our neurology clinic ([phone]).  4. Return to the ED immediately if you develop any new/ worsening symptoms including seizure, headache, numbness/tingling, fatigue, fever/chills, or any other concerning symptoms.

## 2018-10-05 NOTE — ED PROVIDER NOTE - OBJECTIVE STATEMENT
26 yo male PMHx epilepsy presents to ED BIBEMS from work for seizure. States he was at work and had a seizure with +LOC, regained consciousness after 1 minute and remembers the whole incident. Additionally reports a seizure last week which his neurologist knows about. Only came in to ED because his job made him, otherwise he would've rather have gone home. Reports takes valproic acid at home for seizures and is in middle of medication adjustments, but has been taking it as prescribed. Denies head trauma, chest pain, shortness of breath, dizziness, fatigue, n/v, weakness, numbness/tingling, speech/vision changes, incontinence, tremor, fever/chills, abdominal pain, cough. 28 yo male PMHx epilepsy presents to ED BIBEMS from work for seizure. States he was at work and had a seizure with +LOC, regained consciousness after 1 minute and remembers the whole incident. Additionally reports a seizure last week which his neurologist knows about. Only came in to ED because his job made him, otherwise he would've rather have gone home. Reports takes valproic acid at home for seizures and is in middle of medication adjustments, but has been taking it as prescribed. Denies head trauma, chest pain, shortness of breath, dizziness, fatigue, n/v, weakness, numbness/tingling, speech/vision changes, incontinence, tremor, fever/chills, abdominal pain, cough.    Huitron:  seizure with known history and being followed bro by neurologist

## 2018-10-05 NOTE — ED PROVIDER NOTE - PHYSICAL EXAMINATION
Huitron:  General: No distress.  Mentation at baseline.   HEENT: WNL  Chest/Lungs: CTAB, No wheeze, No retractions, No increased work of breathing, Normal rate  Heart: S1S2 RRR, No M/R/G, Pules equal Bilaterally in upper and lower extremities distally  Abd: soft, NT/ND, No guarding, No rebound.  No hernias, no palpable masses.  Extrem: FROM in all joints, no significant edema noted, No ulcers.  Cap refil < 2sec.  Skin: No rash noted, warm dry.  Neuro:  Grossly normal.  No difficulty ambulating. No focal deficits.  Psychiatric: No evidence of delusions. No SI/HI.

## 2018-10-05 NOTE — ED ADULT NURSE NOTE - NSIMPLEMENTINTERV_GEN_ALL_ED
Implemented All Fall Risk Interventions:  Alvin to call system. Call bell, personal items and telephone within reach. Instruct patient to call for assistance. Room bathroom lighting operational. Non-slip footwear when patient is off stretcher. Physically safe environment: no spills, clutter or unnecessary equipment. Stretcher in lowest position, wheels locked, appropriate side rails in place. Provide visual cue, wrist band, yellow gown, etc. Monitor gait and stability. Monitor for mental status changes and reorient to person, place, and time. Review medications for side effects contributing to fall risk. Reinforce activity limits and safety measures with patient and family.

## 2018-10-05 NOTE — ED PROVIDER NOTE - ATTENDING CONTRIBUTION TO CARE
Elana:  I have independently evaluated the patient and have documented in the appropriate sections above.  I agree with the exam and plan as noted above.

## 2018-10-05 NOTE — ED ADULT NURSE NOTE - OBJECTIVE STATEMENT
27y m pt arrived via ems; emt reports pt had a seizure; ems called to place of employment; emt states this is third time he saw this pt s/p seizure within a month; emt state pt was post dictal on their arrival; pt aox3; no resp distress; pt denies vision changes/aura's; pt states already on depakote and arranging for appt with new neurologist as pt states has recently moved here; no visible neuro deficits; pt ambulates with steady gate; pt states did not want to come to er; his boss insisted; pt states already following up with neurologist; pt refuses any testing/blood work; adv sav Hernandez and md Huitron; safety/comfort maintained

## 2018-10-05 NOTE — ED PROVIDER NOTE - PLAN OF CARE
1. Follow up with your PMD in 1-2 days.   2. Rest, stay hydrated. Continue taking all your previously prescribed medications including your seizure medications.   3. Follow up with your neurologist as previously scheduled or within the next 2-3 days. If you cannot get in to see your neurologist please follow up with our neurology clinic ([phone]).  4. Return to the ED immediately if you develop any new/ worsening symptoms including seizure, headache, numbness/tingling, fatigue, fever/chills, or any other concerning symptoms.

## 2020-01-19 NOTE — ED PROVIDER NOTE - PMH
Pulmonology Intensive Care Unit Initial Assessment Subjective: 
 
 
 
Subjective:  
 
Critical Care Initial Evaluation Note: 1/19/2020 1:06 PM 
 
Ms. Linus Doan is 82 yo AAF with h/o CKD, dementia, Parkinson's disease, anemia and hypertension. She was recently discharged on 1/8 after treatment of pneumonia. She lives in nursing home, she was sent to [place] ER today due to altered mental status and hypothermia. Her BG was found to be 16 in ER. She was give bolus of d50W which improved her blood sugar. She was also emergently intubated for airway protection. She was sent to [facility] ICU for further care and monitoring. I am unable to obtain ROS as patient is intubated. She was given rocuronium and etomidate for intubation, she is unresponsive at this time. Past Medical History:  
Diagnosis Date  Anemia  Arthritis  Chronic kidney disease RENAL MASS  Dementia with Lewy bodies ([facility] [address] 03/01/2017  Environmental allergies  HTN (hypertension) 1/19/2012  Hyperlipemia 1/19/2012  Hypertension  Other ill-defined conditions(799.89)   
 parkinson disease  Parkinson disease ([facility] [address] 1/19/2012  S/P radioactive iodine thyroid ablation 1/19/2012  S/P NINI-BSO 1/19/2012  S/P TKR (total knee replacement) 1/19/2012  Thyroid disease   
 goiter/hyperthyroid (s/p iodine tx 1999)  Unspecified adverse effect of anesthesia CLAUSTROPHOBIA Past Surgical History:  
Procedure Laterality Date  ENDOSCOPY, COLON, DIAGNOSTIC  7/2011  
 ([place])  HX GI    
 COLONOSCOPY  
 HX ORTHOPAEDIC    
 r hip, l knee replacement 2008 [facility] [address] [place] [address] Prior to Admission medications Medication Sig Start Date End Date Taking? Authorizing Provider  
carvedilol (COREG) 6.25 mg tablet Take 1 Tab by mouth two (2) times daily (with meals). 1/8/20   Rony Costello MD  
hydrALAZINE (APRESOLINE) 100 mg tablet Take 1 Tab by mouth three (3) times daily.  1/8/20   Rony Costello MD  
 amLODIPine (NORVASC) 10 mg tablet Take 1 Tab by mouth daily. 1/9/20   Jose Fernandes MD  
epoetin jones-epbx (RETACRIT) 10,000 unit/mL injection 2 mL by SubCUTAneous route every seven (7) days. Indications: anemia due to kidney failure 1/13/20   Jose Fernandes MD  
furosemide (LASIX) 40 mg tablet Three times a week Mon, Wed and Friday 1/10/20   Jose Fernandes MD  
patiromer calcium sorbitex (VELTASSA) 8.4 gram powder Take 8.4 g by mouth every Monday, Wednesday, Friday. 1/8/20   Jose Fernandes MD  
losartan (COZAAR) 50 mg tablet Take 1 Tab by mouth daily. 1/9/20   Jose Fernandes MD  
benzonatate (TESSALON) 100 mg capsule Take 100 mg by mouth every eight (8) hours as needed for Cough. Provider, Historical  
guaiFENesin (ROBITUSSIN) 100 mg/5 mL liquid Take 200 mg by mouth every four (4) hours as needed for Cough. Provider, Historical  
polyethylene glycol (MIRALAX) 17 gram packet Take 17 g by mouth daily. Provider, Historical  
sodium polystyrene (KIONEX, WITH SORBITOL,) 15-19.3 gram/60 mL suspension Take 15 g by mouth now. Provider, Historical  
melatonin 3 mg tablet Take  by mouth. Provider, Historical  
L. acidoph & paracasei- S therm- Bifido (EH-Q/RISAQUAD) 8 billion cell cap cap Take 1 Cap by mouth daily. 11/9/18   Alen Álvarez MD  
ondansetron hcl (ZOFRAN) 4 mg tablet Take 4 mg by mouth every eight (8) hours as needed for Nausea. Provider, Historical  
OTHER Rollator Walker with seat 
icd 10 - G20 5/25/16   Joseph Sinclair MD  
lovastatin (MEVACOR) 20 mg tablet TAKE 1 TABLET BY MOUTH EVERY DAY 5/24/16   Joseph Sinclair MD  
docusate sodium (COLACE) 50 mg capsule Take 100 mg by mouth daily. Provider, Historical  
acetaminophen (TYLENOL) 500 mg tablet Take 500 mg by mouth every six (6) hours as needed for Pain. Provider, Historical  
carbidopa-levodopa (SINEMET)  mg per tablet Take 2 Tabs by mouth four (4) times daily.     Provider, Historical  
 multivitamin with iron tablet Take 1 Tab by mouth daily. [employer] [facility]    Provider, Historical  
 
Allergies Allergen Reactions  Bactroban [Mupirocin Calcium] Other (comments) Skin peels  Betadine [Povidone-Iodine] Rash  Comtan [Entacapone] Diarrhea  Erythromycin Other (comments) GI upset  Penicillins Itching  Sulfa (Sulfonamide Antibiotics) Itching Social History Tobacco Use  Smoking status: Never Smoker  Smokeless tobacco: Never Used Substance Use Topics  Alcohol use: No  
  Alcohol/week: 0.0 standard drinks Family History Problem Relation Age of Onset  Coronary Artery Disease Father   
     s/p MI  
 Heart Disease Father  Elevated Lipids Father  Cancer Brother   
     hodgkins  Anesth Problems Neg Hx Review of Systems Unable to obtain ROS. Objective:  
 
Vital signs reviewed. 01/19 0701 - 01/19 1900 In: 1050 [I.V.:1050] Out: - No intake/output data recorded. Physical Exam 
Constitutional:   
   Comments: Intubated. HENT:  
   Head: Normocephalic and atraumatic. Nose: No congestion or rhinorrhea. Mouth/Throat:  
   Mouth: Mucous membranes are moist.  
Eyes:  
   General:     
   Right eye: No discharge. Left eye: No discharge. Conjunctiva/sclera: Conjunctivae normal.  
Neck: Musculoskeletal: Neck supple. No neck rigidity or muscular tenderness. Cardiovascular:  
   Rate and Rhythm: Normal rate and regular rhythm. Pulses: Normal pulses. Heart sounds: Normal heart sounds. No murmur. No friction rub. Pulmonary:  
   Effort: Pulmonary effort is normal. No respiratory distress. Breath sounds: No stridor. Abdominal:  
   General: There is no distension. Palpations: Abdomen is soft. Tenderness: There is no tenderness. Skin: 
   General: Skin is warm. Coloration: Skin is not jaundiced or pale. Psychiatric:  
   Comments: Intubated. Data Review: Recent Results (from the past 24 hour(s)) GLUCOSE, POC Collection Time: 01/19/20  8:04 AM  
Result Value Ref Range Glucose (POC) 16 (LL) 65 - 100 mg/dL Performed by Ash Valentine NT-PRO BNP Collection Time: 01/19/20  8:11 AM  
Result Value Ref Range NT pro-BNP 6,788 (H) 0 - 450 PG/ML  
LIPASE Collection Time: 01/19/20  8:11 AM  
Result Value Ref Range Lipase 88 73 - 393 U/L  
PROTHROMBIN TIME + INR Collection Time: 01/19/20  8:11 AM  
Result Value Ref Range INR 1.3 (H) 0.9 - 1.1 Prothrombin time 12.7 (H) 9.0 - 11.1 sec GLUCOSE, POC Collection Time: 01/19/20  8:15 AM  
Result Value Ref Range Glucose (POC) 302 (H) 65 - 100 mg/dL Performed by Rhoda Leon, POC Collection Time: 01/19/20  8:16 AM  
Result Value Ref Range Glucose (POC) 293 (H) 65 - 100 mg/dL Performed by Nataliya Marcial   
CBC WITH AUTOMATED DIFF Collection Time: 01/19/20  8:20 AM  
Result Value Ref Range WBC 6.3 3.6 - 11.0 K/uL  
 RBC 3.02 (L) 3.80 - 5.20 M/uL HGB 9.3 (L) 11.5 - 16.0 g/dL HCT 30.5 (L) 35.0 - 47.0 % .0 (H) 80.0 - 99.0 FL  
 MCH 30.8 26.0 - 34.0 PG  
 MCHC 30.5 30.0 - 36.5 g/dL  
 RDW 15.8 (H) 11.5 - 14.5 % PLATELET 075 070 - 780 K/uL MPV 9.8 8.9 - 12.9 FL  
 NRBC 0.0 0  WBC ABSOLUTE NRBC 0.00 0.00 - 0.01 K/uL NEUTROPHILS 78 (H) 32 - 75 % LYMPHOCYTES 14 12 - 49 % MONOCYTES 5 5 - 13 % EOSINOPHILS 2 0 - 7 % BASOPHILS 1 0 - 1 % IMMATURE GRANULOCYTES 1 (H) 0.0 - 0.5 % ABS. NEUTROPHILS 5.0 1.8 - 8.0 K/UL  
 ABS. LYMPHOCYTES 0.9 0.8 - 3.5 K/UL  
 ABS. MONOCYTES 0.3 0.0 - 1.0 K/UL  
 ABS. EOSINOPHILS 0.1 0.0 - 0.4 K/UL  
 ABS. BASOPHILS 0.0 0.0 - 0.1 K/UL  
 ABS. IMM. GRANS. 0.0 0.00 - 0.04 K/UL  
 DF AUTOMATED METABOLIC PANEL, COMPREHENSIVE Collection Time: 01/19/20  8:20 AM  
Result Value Ref Range Sodium 148 (H) 136 - 145 mmol/L  Potassium 4.8 3.5 - 5.1 mmol/L  
 Chloride 112 (H) 97 - 108 mmol/L  
 CO2 26 21 - 32 mmol/L Anion gap 10 5 - 15 mmol/L Glucose 15 (LL) 65 - 100 mg/dL BUN 40 (H) 6 - 20 MG/DL Creatinine 2.33 (H) 0.55 - 1.02 MG/DL  
 BUN/Creatinine ratio 17 12 - 20 GFR est AA 24 (L) >60 ml/min/1.73m2 GFR est non-AA 20 (L) >60 ml/min/1.73m2 Calcium 8.2 (L) 8.5 - 10.1 MG/DL Bilirubin, total 0.3 0.2 - 1.0 MG/DL  
 ALT (SGPT) 6 (L) 12 - 78 U/L  
 AST (SGOT) 27 15 - 37 U/L Alk. phosphatase 77 45 - 117 U/L Protein, total 5.8 (L) 6.4 - 8.2 g/dL Albumin 2.1 (L) 3.5 - 5.0 g/dL Globulin 3.7 2.0 - 4.0 g/dL A-G Ratio 0.6 (L) 1.1 - 2.2    
TROPONIN I Collection Time: 01/19/20  8:20 AM  
Result Value Ref Range Troponin-I, Qt. <0.05 <0.05 ng/mL LACTIC ACID Collection Time: 01/19/20  8:20 AM  
Result Value Ref Range Lactic acid 0.5 0.4 - 2.0 MMOL/L  
SAMPLES BEING HELD Collection Time: 01/19/20  8:20 AM  
Result Value Ref Range SAMPLES BEING HELD 1RD 1PNK COMMENT Add-on orders for these samples will be processed based on acceptable specimen integrity and analyte stability, which may vary by analyte. EKG, 12 LEAD, INITIAL Collection Time: 01/19/20  8:52 AM  
Result Value Ref Range Ventricular Rate 70 BPM  
 Atrial Rate 70 BPM  
 P-R Interval 218 ms QRS Duration 104 ms Q-T Interval 416 ms  
 QTC Calculation (Bezet) 449 ms Calculated P Axis 89 degrees Calculated R Axis 70 degrees Calculated T Axis -5 degrees Diagnosis Sinus rhythm with 1st degree AV block Nonspecific T wave abnormality Abnormal ECG When compared with ECG of 06-NOV-2018 22:35, 
MN interval has increased QRS duration has increased ST no longer depressed in Inferior leads GLUCOSE, POC Collection Time: 01/19/20  8:53 AM  
Result Value Ref Range Glucose (POC) 184 (H) 65 - 100 mg/dL Performed by Mayuri Paniagua GLUCOSE, POC Collection Time: 01/19/20  9:18 AM  
Result Value Ref Range Glucose (POC) 157 (H) 65 - 100 mg/dL Performed by Grayland Spatz POC VENOUS BLOOD GAS Collection Time: 01/19/20  9:38 AM  
Result Value Ref Range Device: VENT    
 FIO2 (POC) 100 %  
 pH, venous (POC) 7.401 7.32 - 7.42    
 pCO2, venous (POC) 43.1 41 - 51 MMHG  
 pO2, venous (POC) 99 (H) 25 - 40 mmHg HCO3, venous (POC) 26.8 23.0 - 28.0 MMOL/L  
 sO2, venous (POC) 98 (H) 65 - 88 % Base excess, venous (POC) 2 mmol/L Mode ASSIST CONTROL Tidal volume 270 ml Set Rate 15 bpm  
 Allens test (POC) N/A Total resp. rate 15 Site OTHER Patient temp. 93.6 Specimen type (POC) VENOUS BLOOD    
GLUCOSE, POC Collection Time: 01/19/20 10:10 AM  
Result Value Ref Range Glucose (POC) 117 (H) 65 - 100 mg/dL Performed by Clemencia Denis GLUCOSE, POC Collection Time: 01/19/20 11:08 AM  
Result Value Ref Range Glucose (POC) 185 (H) 65 - 100 mg/dL Performed by Clemencia Denis GLUCOSE, POC Collection Time: 01/19/20 12:01 PM  
Result Value Ref Range Glucose (POC) 141 (H) 65 - 100 mg/dL Performed by Annalee Phoenix Assessment:  
 
-Acute metabolic encephalopathy. In setting of hypoglycemia. 
-Acute respiratory failure. From encephalopathy. 
-Hypoglycemia. 
-Sinus bradycardia. 
-Left lung atelectasis. Likely from mucus plug. 
-Parkinson's disease. 
-Dementia. 
-CHF. No evidence of acute exacerbation. 
-Recent pneumonia. No evidence of sepsis at this point. Plan:  
 
-Continue AC vent support. 
-Titrate FIo2 for sat 88-92%. 
-Discussed the option of bronchoscopy with patient's daughter but defered for now, may do it if she still needs it tomorrow. 
-Dopamine gtt for target HR >60. 
-She was glucagon by EMS. -Start tube feeding. 
-Closely monitor blood glucose. -Continue parkinson meds. -F/u blood and sputum Cx. 
-Aggressive chest physio, hypertonic saline nebs. -She has poor prognosis and is critically ill. -Plan discussed with daughter in detail. Prophylaxis: 
Stress Ulcer Protocol Active: yes, pepcid. DVT Protocol Active: yes. [facility] Total time spent with patient: 50min Epilepsy    Seizure    Seizure disorder

## 2021-01-18 NOTE — ED PROVIDER NOTE - RESPIRATORY, MLM
Quality 226: Preventive Care And Screening: Tobacco Use: Screening And Cessation Intervention: Patient screened for tobacco use and is an ex/non-smoker
Detail Level: Detailed
Quality 402: Tobacco Use And Help With Quitting Among Adolescents: Patient screened for tobacco and never smoked
Breath sounds clear and equal bilaterally.

## 2021-09-09 NOTE — ED ADULT NURSE NOTE - FINAL NURSING ELECTRONIC SIGNATURE
Alert-The patient is alert, awake and responds to voice. The patient is oriented to time, place, and person. The triage nurse is able to obtain subjective information.
01-Jun-2018 19:06

## 2022-06-27 NOTE — ED ADULT NURSE NOTE - NS ED NURSE IV DC DT
Patient was contacted and notified of Dr. Lindsay shelter recommendations. Patient verbalized understanding and is agreeable with plan of care. He has no further questions at this time. 21-Apr-2018 17:42

## 2022-09-19 NOTE — ED PROVIDER NOTE - NEUROLOGICAL PRONATOR DRIFT
COVID SCREENING TO BE DONE 48-72 HOURS PRIOR TO PROCEDURE IF PT HAS NOT RECEIVED BOTH COVID VACCINATIONS OR HAS BEEN VACCINATED WITHIN THE LAST 2 WEEKS. VACCINATION CARD MUST BE PROVIDED IF PT HAS BEEN VACCINATED OR PT MUST HAVE COVID SCREENING IN ORDER TO HAVE PROCEDURE.  IF YOUR PROCEDURE IS ON A Tuesday, GET COVID TESTING ON THE Friday PRIOR TO PROCEDURE.  IF YOUR PROCEDURE IS ON A Thursday GET COVID TESTING ON THE Monday OR Tuesday PRIOR TO PROCEDURE.    IF YOUR PRIMARY CARE DOCTOR ORDERS YOUR COVID TESTING YOU MUST BRING YOUR RESULTS WITH YOU TO YOUR PROCEDURE.    Procedure Instructions:    Nothing to eat or drink for 8 hours or after midnight including gum, candy, mints, or tobacco products.  If you are scheduled for 1:30 or later nothing to eat or drink after 5 a.m. the morning of the procedure, including gum, candy, mints, or tobacco products.  Must have a  at least 18 yrs of age to stay present at all times  No Diabetic medications the morning of procedure, check blood sugar the morning of procedure, if it is greater than 200 call the office at [phone]  If you are started on antibiotics or have been prescribed antibiotics, have a fever, or have any other type of infection call to reschedule procedure.  If you take blood pressure medications you can take it at your regular scheduled time.        HOLD ASPIRIN AND ASPIRIN PRODUCTS  (ASPIRIN, BC POWDER ETC. ) FOR 7 DAYS  PRIOR TO PROCEDURE  HOLD NSAIDS( ibuprofen, mobic, meloxicam, advil, diclofenac, methotrexate, aleve etc....)  FOR 3 DAYS   PRIOR TO PROCEDURE      none

## 2023-12-11 NOTE — ED PROVIDER NOTE - RESPIRATORY, MLM
Show Applicator Variable?: Yes
Application Tool (Optional): Liquid Nitrogen Sprayer
Consent: The patient's consent was obtained including but not limited to risks of crusting, scabbing, blistering, scarring, darker or lighter pigmentary change, recurrence, incomplete removal and infection.
Render Post-Care Instructions In Note?: no
Detail Level: Zone
Duration Of Freeze Thaw-Cycle (Seconds): 0
Post-Care Instructions: I reviewed with the patient in detail post-care instructions. Patient is to wear sunprotection, and avoid picking at any of the treated lesions. Pt may apply Vaseline to crusted or scabbing areas.
Breath sounds clear and equal bilaterally.

## 2023-12-15 ENCOUNTER — NON-APPOINTMENT (OUTPATIENT)
Age: 33
End: 2023-12-15

## 2023-12-15 ENCOUNTER — APPOINTMENT (OUTPATIENT)
Dept: INTERNAL MEDICINE | Facility: CLINIC | Age: 33
End: 2023-12-15
Payer: COMMERCIAL

## 2023-12-15 VITALS
OXYGEN SATURATION: 98 % | SYSTOLIC BLOOD PRESSURE: 112 MMHG | HEART RATE: 88 BPM | WEIGHT: 280 LBS | BODY MASS INDEX: 39.2 KG/M2 | DIASTOLIC BLOOD PRESSURE: 90 MMHG | HEIGHT: 71 IN

## 2023-12-15 DIAGNOSIS — B35.6 TINEA CRURIS: ICD-10-CM

## 2023-12-15 DIAGNOSIS — Z11.3 ENCOUNTER FOR SCREENING FOR INFECTIONS WITH A PREDOMINANTLY SEXUAL MODE OF TRANSMISSION: ICD-10-CM

## 2023-12-15 DIAGNOSIS — L72.9 FOLLICULAR CYST OF THE SKIN AND SUBCUTANEOUS TISSUE, UNSPECIFIED: ICD-10-CM

## 2023-12-15 DIAGNOSIS — Z13.6 ENCOUNTER FOR SCREENING FOR CARDIOVASCULAR DISORDERS: ICD-10-CM

## 2023-12-15 DIAGNOSIS — E66.9 OBESITY, UNSPECIFIED: ICD-10-CM

## 2023-12-15 DIAGNOSIS — Z12.9 ENCOUNTER FOR SCREENING FOR MALIGNANT NEOPLASM, SITE UNSPECIFIED: ICD-10-CM

## 2023-12-15 DIAGNOSIS — Z13.228 ENCOUNTER FOR SCREENING FOR OTHER METABOLIC DISORDERS: ICD-10-CM

## 2023-12-15 DIAGNOSIS — G40.909 EPILEPSY, UNSPECIFIED, NOT INTRACTABLE, W/OUT STATUS EPILEPTICUS: ICD-10-CM

## 2023-12-15 DIAGNOSIS — Z78.9 OTHER SPECIFIED HEALTH STATUS: ICD-10-CM

## 2023-12-15 DIAGNOSIS — Z87.891 PERSONAL HISTORY OF NICOTINE DEPENDENCE: ICD-10-CM

## 2023-12-15 DIAGNOSIS — Z80.51 FAMILY HISTORY OF MALIGNANT NEOPLASM OF KIDNEY: ICD-10-CM

## 2023-12-15 PROCEDURE — 93000 ELECTROCARDIOGRAM COMPLETE: CPT

## 2023-12-15 PROCEDURE — 99204 OFFICE O/P NEW MOD 45 MIN: CPT | Mod: 25

## 2023-12-15 RX ORDER — CLOTRIMAZOLE 10 MG/G
1 CREAM TOPICAL TWICE DAILY
Qty: 1 | Refills: 0 | Status: ACTIVE | COMMUNITY
Start: 2023-12-15 | End: 1900-01-01

## 2023-12-16 LAB
25(OH)D3 SERPL-MCNC: 17.6 NG/ML
ALBUMIN SERPL ELPH-MCNC: 4.9 G/DL
ALP BLD-CCNC: 70 U/L
ALT SERPL-CCNC: 86 U/L
ANION GAP SERPL CALC-SCNC: 15 MMOL/L
APPEARANCE: CLEAR
AST SERPL-CCNC: 44 U/L
BACTERIA: NEGATIVE /HPF
BASOPHILS # BLD AUTO: 0.02 K/UL
BASOPHILS NFR BLD AUTO: 0.4 %
BILIRUB SERPL-MCNC: 0.6 MG/DL
BILIRUBIN URINE: NEGATIVE
BLOOD URINE: NEGATIVE
BUN SERPL-MCNC: 12 MG/DL
C TRACH RRNA SPEC QL NAA+PROBE: NOT DETECTED
CALCIUM SERPL-MCNC: 10.2 MG/DL
CAST: 0 /LPF
CHLORIDE SERPL-SCNC: 102 MMOL/L
CHOLEST SERPL-MCNC: 203 MG/DL
CK SERPL-CCNC: 191 U/L
CO2 SERPL-SCNC: 20 MMOL/L
COLOR: YELLOW
CREAT SERPL-MCNC: 0.85 MG/DL
CREAT SPEC-SCNC: 145 MG/DL
EGFR: 118 ML/MIN/1.73M2
EOSINOPHIL # BLD AUTO: 0.03 K/UL
EOSINOPHIL NFR BLD AUTO: 0.6 %
EPITHELIAL CELLS: 0 /HPF
ESTIMATED AVERAGE GLUCOSE: 105 MG/DL
FERRITIN SERPL-MCNC: 641 NG/ML
FOLATE SERPL-MCNC: 6.5 NG/ML
GLUCOSE QUALITATIVE U: NEGATIVE MG/DL
GLUCOSE SERPL-MCNC: 78 MG/DL
HAV IGM SER QL: NONREACTIVE
HBA1C MFR BLD HPLC: 5.3 %
HBV CORE IGM SER QL: NONREACTIVE
HBV SURFACE AG SER QL: NONREACTIVE
HCT VFR BLD CALC: 49.7 %
HCV AB SER QL: NONREACTIVE
HCV S/CO RATIO: 0.07 S/CO
HDLC SERPL-MCNC: 36 MG/DL
HGB BLD-MCNC: 17.1 G/DL
HIV1+2 AB SPEC QL IA.RAPID: NONREACTIVE
HSV 1+2 IGG SER IA-IMP: NEGATIVE
HSV 1+2 IGG SER IA-IMP: NEGATIVE
HSV1 IGG SER QL: <0.01 INDEX
HSV2 IGG SER QL: 0.03 INDEX
IMM GRANULOCYTES NFR BLD AUTO: 0.2 %
IRON SATN MFR SERPL: 32 %
IRON SERPL-MCNC: 122 UG/DL
KETONES URINE: NEGATIVE MG/DL
LDLC SERPL CALC-MCNC: 149 MG/DL
LEUKOCYTE ESTERASE URINE: NEGATIVE
LYMPHOCYTES # BLD AUTO: 2.07 K/UL
LYMPHOCYTES NFR BLD AUTO: 40.7 %
MAN DIFF?: NORMAL
MCHC RBC-ENTMCNC: 30.6 PG
MCHC RBC-ENTMCNC: 34.4 GM/DL
MCV RBC AUTO: 88.9 FL
MICROALBUMIN 24H UR DL<=1MG/L-MCNC: <1.2 MG/DL
MICROALBUMIN/CREAT 24H UR-RTO: NORMAL MG/G
MICROSCOPIC-UA: NORMAL
MONOCYTES # BLD AUTO: 0.33 K/UL
MONOCYTES NFR BLD AUTO: 6.5 %
N GONORRHOEA RRNA SPEC QL NAA+PROBE: NOT DETECTED
NEUTROPHILS # BLD AUTO: 2.62 K/UL
NEUTROPHILS NFR BLD AUTO: 51.6 %
NITRITE URINE: NEGATIVE
NONHDLC SERPL-MCNC: 167 MG/DL
PH URINE: 6
PLATELET # BLD AUTO: 252 K/UL
POTASSIUM SERPL-SCNC: 4.1 MMOL/L
PROT SERPL-MCNC: 7.5 G/DL
PROTEIN URINE: NEGATIVE MG/DL
PSA SERPL-MCNC: 0.83 NG/ML
RBC # BLD: 5.59 M/UL
RBC # FLD: 12.6 %
RED BLOOD CELLS URINE: 0 /HPF
SODIUM SERPL-SCNC: 138 MMOL/L
SOURCE AMPLIFICATION: NORMAL
SPECIFIC GRAVITY URINE: 1.02
T PALLIDUM AB SER QL IA: NEGATIVE
T4 FREE SERPL-MCNC: 1.3 NG/DL
TIBC SERPL-MCNC: 377 UG/DL
TRIGL SERPL-MCNC: 102 MG/DL
TSH SERPL-ACNC: 0.5 UIU/ML
UIBC SERPL-MCNC: 255 UG/DL
URATE SERPL-MCNC: 9 MG/DL
UROBILINOGEN URINE: 1 MG/DL
VIT B12 SERPL-MCNC: 342 PG/ML
WBC # FLD AUTO: 5.08 K/UL
WHITE BLOOD CELLS URINE: 1 /HPF

## 2023-12-17 PROBLEM — L72.9 CYST OF SCROTUM: Status: ACTIVE | Noted: 2023-12-15

## 2023-12-17 PROBLEM — Z12.9 SCREENING FOR CANCER: Status: ACTIVE | Noted: 2023-12-15

## 2023-12-17 PROBLEM — Z80.51 FAMILY HISTORY OF MALIGNANT NEOPLASM OF KIDNEY: Status: ACTIVE | Noted: 2023-12-15

## 2023-12-17 PROBLEM — Z87.891 FORMER SMOKER: Status: ACTIVE | Noted: 2023-12-15

## 2023-12-17 PROBLEM — E66.9 OBESITY (BMI 30-39.9): Status: ACTIVE | Noted: 2023-12-15

## 2023-12-17 PROBLEM — Z13.6 SCREENING FOR HEART DISEASE: Status: ACTIVE | Noted: 2023-12-15

## 2023-12-17 PROBLEM — B35.6 JOCK ITCH: Status: ACTIVE | Noted: 2023-12-15

## 2023-12-17 PROBLEM — Z78.9 SOCIAL ALCOHOL USE: Status: ACTIVE | Noted: 2023-12-15

## 2023-12-17 PROBLEM — G40.909 EPILEPSY: Status: ACTIVE | Noted: 2023-12-15

## 2023-12-17 PROBLEM — Z11.3 SCREEN FOR STD (SEXUALLY TRANSMITTED DISEASE): Status: ACTIVE | Noted: 2023-12-15

## 2023-12-17 PROBLEM — Z13.228 SCREENING FOR METABOLIC DISORDER: Status: ACTIVE | Noted: 2023-12-15

## 2023-12-17 NOTE — HISTORY OF PRESENT ILLNESS
[FreeTextEntry1] : establish care [de-identified] : This is a 34 y/o male with PMHx of epilepsy presents here today to establish care.  He c/o a pimple-like bump on his scrotum. First noticed it 2 weeks ago. Pt says it was initially red but now looks white. Also says he had blood and clear discharge this past Sunday and now improved and is much smaller. Denies dysuria, urinary frequency, urgency, hematuria.   Denies f/c. Denies chest pain, sob, quiroz, dizziness, orthopnea, diaphoresis, palpitations, LE swelling, orthopnea, syncope, n/v, headache.

## 2023-12-17 NOTE — HEALTH RISK ASSESSMENT
[0] : 2) Feeling down, depressed, or hopeless: Not at all (0) [PHQ-2 Negative - No further assessment needed] : PHQ-2 Negative - No further assessment needed [Former] : Former [[id number]] : 0

## 2023-12-18 DIAGNOSIS — R79.89 OTHER SPECIFIED ABNORMAL FINDINGS OF BLOOD CHEMISTRY: ICD-10-CM

## 2023-12-18 RX ORDER — ERGOCALCIFEROL 1.25 MG/1
1.25 MG CAPSULE, LIQUID FILLED ORAL
Qty: 8 | Refills: 0 | Status: ACTIVE | COMMUNITY
Start: 2023-12-18 | End: 1900-01-01

## 2024-02-09 ENCOUNTER — APPOINTMENT (OUTPATIENT)
Dept: ULTRASOUND IMAGING | Facility: CLINIC | Age: 34
End: 2024-02-09
Payer: COMMERCIAL

## 2024-02-09 ENCOUNTER — OUTPATIENT (OUTPATIENT)
Dept: OUTPATIENT SERVICES | Facility: HOSPITAL | Age: 34
LOS: 1 days | End: 2024-02-09
Payer: COMMERCIAL

## 2024-02-09 ENCOUNTER — NON-APPOINTMENT (OUTPATIENT)
Age: 34
End: 2024-02-09

## 2024-02-09 DIAGNOSIS — Z00.8 ENCOUNTER FOR OTHER GENERAL EXAMINATION: ICD-10-CM

## 2024-02-09 DIAGNOSIS — R79.89 OTHER SPECIFIED ABNORMAL FINDINGS OF BLOOD CHEMISTRY: ICD-10-CM

## 2024-02-09 PROCEDURE — 76870 US EXAM SCROTUM: CPT | Mod: 26

## 2024-02-09 PROCEDURE — 76705 ECHO EXAM OF ABDOMEN: CPT | Mod: 26

## 2024-02-09 PROCEDURE — 76705 ECHO EXAM OF ABDOMEN: CPT

## 2024-02-09 PROCEDURE — 76870 US EXAM SCROTUM: CPT

## 2024-02-12 LAB
ALBUMIN SERPL ELPH-MCNC: 4.8 G/DL
ALP BLD-CCNC: 68 U/L
ALT SERPL-CCNC: 105 U/L
ANION GAP SERPL CALC-SCNC: 16 MMOL/L
AST SERPL-CCNC: 54 U/L
BILIRUB SERPL-MCNC: 0.6 MG/DL
BUN SERPL-MCNC: 13 MG/DL
CALCIUM SERPL-MCNC: 9.5 MG/DL
CHLORIDE SERPL-SCNC: 103 MMOL/L
CO2 SERPL-SCNC: 24 MMOL/L
CREAT SERPL-MCNC: 0.95 MG/DL
EGFR: 109 ML/MIN/1.73M2
FERRITIN SERPL-MCNC: 582 NG/ML
GLUCOSE SERPL-MCNC: 101 MG/DL
HCT VFR BLD CALC: 48.9 %
HGB BLD-MCNC: 16.5 G/DL
MCHC RBC-ENTMCNC: 30.6 PG
MCHC RBC-ENTMCNC: 33.7 GM/DL
MCV RBC AUTO: 90.7 FL
PLATELET # BLD AUTO: 247 K/UL
POTASSIUM SERPL-SCNC: 4.3 MMOL/L
PROT SERPL-MCNC: 7.2 G/DL
RBC # BLD: 5.39 M/UL
RBC # FLD: 12.8 %
SODIUM SERPL-SCNC: 142 MMOL/L
WBC # FLD AUTO: 5.05 K/UL

## 2024-06-07 ENCOUNTER — OUTPATIENT (OUTPATIENT)
Dept: OUTPATIENT SERVICES | Facility: HOSPITAL | Age: 34
LOS: 1 days | End: 2024-06-07
Payer: COMMERCIAL

## 2024-06-07 ENCOUNTER — RESULT REVIEW (OUTPATIENT)
Age: 34
End: 2024-06-07

## 2024-06-07 ENCOUNTER — APPOINTMENT (OUTPATIENT)
Dept: MRI IMAGING | Facility: CLINIC | Age: 34
End: 2024-06-07
Payer: COMMERCIAL

## 2024-06-07 DIAGNOSIS — G40.909 EPILEPSY, UNSPECIFIED, NOT INTRACTABLE, WITHOUT STATUS EPILEPTICUS: ICD-10-CM

## 2024-06-07 PROCEDURE — 70551 MRI BRAIN STEM W/O DYE: CPT | Mod: 26

## 2024-06-07 PROCEDURE — 76377 3D RENDER W/INTRP POSTPROCES: CPT

## 2024-06-07 PROCEDURE — 70551 MRI BRAIN STEM W/O DYE: CPT

## 2024-06-07 PROCEDURE — 76377 3D RENDER W/INTRP POSTPROCES: CPT | Mod: 26

## 2025-01-17 ENCOUNTER — EMERGENCY (EMERGENCY)
Facility: HOSPITAL | Age: 35
LOS: 1 days | Discharge: HOME | End: 2025-01-17
Attending: EMERGENCY MEDICINE
Payer: COMMERCIAL

## 2025-01-17 VITALS
DIASTOLIC BLOOD PRESSURE: 88 MMHG | OXYGEN SATURATION: 96 % | TEMPERATURE: 97 F | HEART RATE: 107 BPM | SYSTOLIC BLOOD PRESSURE: 140 MMHG | WEIGHT: 251.99 LBS | HEIGHT: 71 IN | RESPIRATION RATE: 20 BRPM

## 2025-01-17 VITALS — HEART RATE: 85 BPM

## 2025-01-17 PROCEDURE — 12013 RPR F/E/E/N/L/M 2.6-5.0 CM: CPT

## 2025-01-17 PROCEDURE — 99282 EMERGENCY DEPT VISIT SF MDM: CPT | Mod: 25

## 2025-01-17 PROCEDURE — 99284 EMERGENCY DEPT VISIT MOD MDM: CPT | Mod: 25

## 2025-01-17 RX ORDER — LIDOCAINE HYDROCHLORIDE,EPINEPHRINE BITARTRATE 20; .005 MG/ML; MG/ML
20 INJECTION, SOLUTION EPIDURAL; INFILTRATION; INTRACAUDAL; PERINEURAL ONCE
Refills: 0 | Status: DISCONTINUED | OUTPATIENT
Start: 2025-01-17 | End: 2025-01-21

## 2025-01-17 NOTE — ED ADULT TRIAGE NOTE - CHIEF COMPLAINT QUOTE
Pt had a seizure 2hrs ago which he states is normal for him has hx of epilepsy. Pt hit his L eye and is now having swelling, bruising, and presenting with a lac. Pt went to urgent care however they didn't have optho so was sent into ED. Pt does not want to be worked up for seizure as this is normal for him, pt states he's only here for sutures and for someone "to look at his eye." Not having any visual disturbances.

## 2025-01-17 NOTE — ED ADULT NURSE NOTE - NSFALLRISKINTERV_ED_ALL_ED

## 2025-01-17 NOTE — ED PROVIDER NOTE - PHYSICAL EXAMINATION
NAD. Tachycardia. Afebrile. +PERRL, EOMI without tender. Vision Acuity- 20/20 bilaterally. +Left periorbital swelling, ecchymosis, and multiple superficial abrasions. Neck supple. Lungs clear. No spinal tender. Neuro- intact.

## 2025-01-17 NOTE — ED ADULT NURSE NOTE - OBJECTIVE STATEMENT
35 y/o male PMH epilepsy presents to ED c/o L forehead laceration. Pt states he had seizure around 4pm, fell to ground and hit forehead. Pt states he does not want to get worked up for seizure, only wants to be seen for suture placement. 33 y/o male PMH epilepsy presents to ED c/o L forehead laceration. Pt states he had seizure around 4pm, fell to ground and hit forehead. Pt states he does not want to get worked up for seizure, only wants to be seen for sutures.

## 2025-01-17 NOTE — ED PROVIDER NOTE - SECONDARY DIAGNOSIS.
Laceration of eyebrow, left Closed head injury with concussion, with loss of consciousness, initial encounter

## 2025-01-17 NOTE — ED PROCEDURE NOTE - PROCEDURE ADDITIONAL DETAILS
4 cm linear laceration through L eyebrow, no FB, no crepitus/depression, VF/VA wnl, EOMI w/o pain/diplopia   - anesthesia L SUPERIOR ORBITAL NERVE BLOCK:  sterile, by landmarks, lidocaine 1%/epi 2 ml   - copious irrigation   - FAPG 6-0 running cutaneous suture w/ great approximation    Melo Hough MD

## 2025-01-17 NOTE — ED PROVIDER NOTE - NSFOLLOWUPINSTRUCTIONS_ED_ALL_ED_FT
See your Primary Doctor / Neurologist next week for follow up -- call to discuss.    Rest, stay well hydrated, avoid screen time / strenuous activity, continue current medications.    Sutures are ABSORBABLE and will DISSOLVE after 5 days -- be gentle to wound.    See FACIAL LACERATION and CONCUSSION information and return instructions given to you.    Seek immediate medical care for new/worsening symptoms/concerns.

## 2025-01-17 NOTE — ED PROVIDER NOTE - ATTENDING APP SHARED VISIT CONTRIBUTION OF CARE
------------ATTENDING NOTE------------  pt w/ wife c/o breakthrough seizure, his typical occurrence seizure, compliant w/ medications, hit forehead on ground and laceration to L eye, nml neuro exam, no HA/AMS, very low suspicion for ciTBI, wound repaired, HEENT / eye eaxams wnl, in depth dw all about ddx, tx, jett, continued close outpt fu.  - Melo Hough MD   ------------------------------------------------------

## 2025-01-17 NOTE — ED PROVIDER NOTE - PATIENT PORTAL LINK FT
You can access the FollowMyHealth Patient Portal offered by [facility] by registering at the following website: [email or web address]. By joining [employer]’s FollowMyHealth portal, you will also be able to view your health information using other applications (apps) compatible with our system.

## 2025-01-17 NOTE — ED PROVIDER NOTE - OBJECTIVE STATEMENT
33yo male with PMHx of Epilepsy on neuro f/u presents to ED with left eyebrow laceration during seizure, hit his face on the ground, this 4pm. Reports he often has seizure and never missed his meds. Denies other injuries. Denies visual changes or N/V. Denies sensory changes or weakness to extremities. Denies fever, chills, or recent sickness. TDAP-utded.

## 2025-01-17 NOTE — ED PROVIDER NOTE - CARE PLAN
Principal Discharge DX:	Breakthrough seizure  Secondary Diagnosis:	Laceration of eyebrow, left  Secondary Diagnosis:	Closed head injury with concussion, with loss of consciousness, initial encounter   1
